# Patient Record
Sex: FEMALE | Race: BLACK OR AFRICAN AMERICAN | NOT HISPANIC OR LATINO
[De-identification: names, ages, dates, MRNs, and addresses within clinical notes are randomized per-mention and may not be internally consistent; named-entity substitution may affect disease eponyms.]

---

## 2020-05-23 ENCOUNTER — TRANSCRIPTION ENCOUNTER (OUTPATIENT)
Age: 65
End: 2020-05-23

## 2022-08-23 ENCOUNTER — EMERGENCY (EMERGENCY)
Facility: HOSPITAL | Age: 67
LOS: 1 days | Discharge: HOME | End: 2022-08-23
Payer: MEDICARE

## 2022-08-23 VITALS
DIASTOLIC BLOOD PRESSURE: 88 MMHG | RESPIRATION RATE: 18 BRPM | TEMPERATURE: 98 F | HEART RATE: 108 BPM | OXYGEN SATURATION: 100 % | WEIGHT: 210.1 LBS | SYSTOLIC BLOOD PRESSURE: 149 MMHG | HEIGHT: 65 IN

## 2022-08-23 PROCEDURE — 99284 EMERGENCY DEPT VISIT MOD MDM: CPT

## 2022-08-23 PROCEDURE — 93010 ELECTROCARDIOGRAM REPORT: CPT

## 2022-08-24 VITALS
HEART RATE: 65 BPM | DIASTOLIC BLOOD PRESSURE: 59 MMHG | OXYGEN SATURATION: 100 % | RESPIRATION RATE: 18 BRPM | TEMPERATURE: 98 F | SYSTOLIC BLOOD PRESSURE: 123 MMHG

## 2022-08-24 PROCEDURE — 99284 EMERGENCY DEPT VISIT MOD MDM: CPT | Mod: 25

## 2022-08-24 PROCEDURE — 70450 CT HEAD/BRAIN W/O DYE: CPT | Mod: MA

## 2022-08-24 PROCEDURE — 70486 CT MAXILLOFACIAL W/O DYE: CPT | Mod: MA

## 2022-08-24 PROCEDURE — 70450 CT HEAD/BRAIN W/O DYE: CPT | Mod: 26,MA

## 2022-08-24 PROCEDURE — 70486 CT MAXILLOFACIAL W/O DYE: CPT | Mod: 26,MA

## 2022-08-24 PROCEDURE — 93005 ELECTROCARDIOGRAM TRACING: CPT

## 2022-08-24 RX ORDER — ACETAMINOPHEN 500 MG
975 TABLET ORAL ONCE
Refills: 0 | Status: COMPLETED | OUTPATIENT
Start: 2022-08-24 | End: 2022-08-24

## 2022-08-24 RX ORDER — IBUPROFEN 200 MG
600 TABLET ORAL ONCE
Refills: 0 | Status: COMPLETED | OUTPATIENT
Start: 2022-08-24 | End: 2022-08-24

## 2022-08-24 RX ADMIN — Medication 600 MILLIGRAM(S): at 01:21

## 2022-08-24 RX ADMIN — Medication 975 MILLIGRAM(S): at 01:20

## 2022-08-24 NOTE — ED PROVIDER NOTE - PHYSICAL EXAMINATION
HEENT: minimal swelling appreciated to left side of face. ttp to lateral aspect of left side of face. neck: supple. ears: good cone of light. no erythema to canal. no drainage from ear. normal TM.

## 2022-08-24 NOTE — ED PROVIDER NOTE - OBJECTIVE STATEMENT
68 y/o female, hx of fibromyalgia, HTN, TMJ, presents to the ER with complaint of left sided jaw pain. states pain started three days ago. states pain radiates from the jaw to the left ear and the left side of her head. states went to both her dentist and endodontist both with no acute findings. denies f/n/v/d, CP, throat pain, dizziness, denies toothache, LOC, numbness, tingling.

## 2022-08-24 NOTE — ED PROVIDER NOTE - ATTENDING APP SHARED VISIT CONTRIBUTION OF CARE
MD Gannon:  patient seen and evaluated with the PA.  I was present for key portions of the History and Physical, and I agree with the Impression and Plan.    MD Gannon:  66 yo F, c/o L jaw pain.    Duration: 3d  Quality: stabbing pain from mandible to L ear.  Worse:  any jaw movement.    Assocated Sx: no Cp/SOB, no n/v/d, no f/c, no diaphoresis.    Better: tramadol (takes for management of fibromyalgia).  VS: wnl.  Physical Exam: adult F, NAD,   NCAT, L parotid gland TTP, PERRL, EOMI, neck supple, RRR, CTA B, Abd: s/nd/nt, Ext: no edema, Neuro: AAOx3, ambulates w/o diff, strength 5/5 & symmetric throughout.  Impression:  possible parotid stone vs TMJ syndrome  Plan:  basic labs, CT max/face, NSAIDs, apap, reassess. MD Gannon:  patient seen and evaluated with the PA.  I was present for key portions of the History and Physical, and I agree with the Impression and Plan.    MD Gannon:  68 yo F, c/o L jaw pain.    Duration: 3d  Quality: stabbing pain from mandible to L ear.  Worse:  any jaw movement.    Assocated Sx: no Cp/SOB, no n/v/d, no f/c, no diaphoresis.    Better: tramadol (takes for management of fibromyalgia).  VS: wnl.  Physical Exam: adult F, NAD,   NCAT, L parotid gland TTP, PERRL, EOMI, neck supple, RRR, CTA B, Abd: s/nd/nt, Ext: no edema, Neuro: AAOx3, ambulates w/o diff, strength 5/5 & symmetric throughout.  Impression:  possible parotid stone vs TMJ syndrome  Plan:  basic labs, CT max/face, NSAIDs, apap, reassess.  ECG= sinus bradycardia; further cardiac workup with trops not indicated at this time.

## 2022-08-24 NOTE — ED PROVIDER NOTE - PATIENT PORTAL LINK FT
You can access the FollowMyHealth Patient Portal offered by [facility] by registering at the following website: [email or web address]. By joining [employer]’s FollowMyHealth portal, you will also be able to view your health information using other applications (apps) compatible with our system.

## 2022-08-24 NOTE — ED PROVIDER NOTE - NSFOLLOWUPCLINICS_GEN_ALL_ED_FT
[facility] - ENT  Otolaryngology (ENT)  [address] [place] [address]  Phone: [phone]  Fax:

## 2022-08-24 NOTE — ED PROVIDER NOTE - PROGRESS NOTE DETAILS
Neymar KITCHEN: imaging reviewed. no acute pathology noted. patient to follow up with ENT regarding today's visit. patient in agreement with plan. strict return precautions discussed. stable for discharge.

## 2022-08-24 NOTE — ED PROVIDER NOTE - CLINICAL SUMMARY MEDICAL DECISION MAKING FREE TEXT BOX
Impression:  possible parotid stone vs TMJ syndrome  Plan:  basic labs, CT max/face, NSAIDs, apap, reassess.

## 2022-08-24 NOTE — ED ADULT NURSE NOTE - OBJECTIVE STATEMENT
66 y/o female, hx of fibromyalgia, HTN, TMJ, presents to the ER with complaint of left sided jaw pain. states pain started three days ago. states pain radiates from the jaw to the left ear and the left side of her head. states went to both her dentist and endodontist both with no acute findings. denies f/n/v/d, CP, throat pain, dizziness, denies toothache, LOC, numbness, tingling

## 2022-08-24 NOTE — ED PROVIDER NOTE - NSFOLLOWUPINSTRUCTIONS_ED_ALL_ED_FT
you were seen in the ER today for jaw pain.   imaging was done while you were in the ER.   your symptoms are likely secondary to TMJ.   you can take Tylenol or Motrin for pain as instructed.   it is important to follow up with the ENT regarding today's visit.     if your symptoms worsen, persist, or if any new symptoms develop, or if you experience any signs of distress, return to the ER as soon as possible.

## 2022-08-25 ENCOUNTER — APPOINTMENT (OUTPATIENT)
Dept: OTOLARYNGOLOGY | Facility: CLINIC | Age: 67
End: 2022-08-25

## 2022-08-25 VITALS
HEIGHT: 65.5 IN | SYSTOLIC BLOOD PRESSURE: 133 MMHG | HEART RATE: 76 BPM | DIASTOLIC BLOOD PRESSURE: 87 MMHG | WEIGHT: 207 LBS | BODY MASS INDEX: 34.07 KG/M2

## 2022-08-25 DIAGNOSIS — M26.609 UNSPECIFIED TEMPOROMANDIBULAR JOINT DISORDER: ICD-10-CM

## 2022-08-25 PROBLEM — Z00.00 ENCOUNTER FOR PREVENTIVE HEALTH EXAMINATION: Status: ACTIVE | Noted: 2022-08-25

## 2022-08-25 PROCEDURE — 31575 DIAGNOSTIC LARYNGOSCOPY: CPT

## 2022-08-25 PROCEDURE — 99204 OFFICE O/P NEW MOD 45 MIN: CPT | Mod: 25

## 2022-08-25 RX ORDER — IBUPROFEN 800 MG/1
800 TABLET, FILM COATED ORAL
Qty: 100 | Refills: 0 | Status: ACTIVE | COMMUNITY
Start: 2022-08-25 | End: 1900-01-01

## 2022-08-25 NOTE — PHYSICAL EXAM
[Nasal Endoscopy Performed] : nasal endoscopy was performed, see procedure section for findings [Normal] : palatine tonsils are normal [de-identified] : Salivary flow through stensons and whartons ducts bilaterally [de-identified] : Both R and L TMJ with popping and clicking upon opening, tenderness to palpation

## 2022-08-25 NOTE — REVIEW OF SYSTEMS
[Ear Pain] : ear pain [Ear Itch] : ear itch [Vertigo] : vertigo [Lightheadedness] : lightheadedness [Ear Noises] : ear noises [Hoarseness] : hoarseness [Throat Clearing] : throat clearing [Throat Pain] : throat pain [Throat Dryness] : throat dryness [Swelling Neck] : swelling neck [Swelling Face] : face swelling [Eye Pain] : eye pain [Cough] : cough [Joint Pain] : joint pain [Confusion] : confusion [Dizziness] : dizziness [Fainting] : fainting [Negative] : Heme/Lymph

## 2022-08-25 NOTE — REASON FOR VISIT
[Initial Consultation] : an initial consultation for [FreeTextEntry2] : Left Side pain ear and jaw  [FreeTextEntry1] : [facility] Emergency Dept

## 2022-08-25 NOTE — HISTORY OF PRESENT ILLNESS
[de-identified] : 67F with PMH significant for tooth grinding, wears , carries diagnosis of TMJ arthralgia, with several days of L ear and jaw pain radiation along the temporalis distribution. Was seen in by dentist and endodontist who did not identify a source of the pain, but prescribed clinda. She was then seen in the ED where CT scan did not show any concerning findings. She presents with pain unchanged from previous few days. Hurts to hold her jaw open. no hearing changes. Does have vertigo, which she has had since covid several months ago. takes meclezine for this PRN. Denies nasal pain/drainage. Also reports hx of reflux for which she is managing conservatively. Endorses left sore throat as well.

## 2022-08-25 NOTE — PROCEDURE
[de-identified] : Bilateral nasal endoscopy:\par \par Left:\par Septum midline\par Mild inferior turbinate hypertrophy \par Middle meatus clear, without masses, polyps, or pus\par Sphenoethmoidal recess clear, without masses, polyps, or pus\par \par Right: \par Septum midline\par Mild inferior turbinate hypertrophy\par Middle meatus clear, without masses, polyps, or pus \par Sphenoethmoidal recess clear, without masses, polyps, or pus\par \par NPL:\par Nasopharynx clear without masses\par Base of tongue without masses or lesions\par Vallecula clear\par Pyriforms clear\par Epiglottis crisp\par TVFs mobile bilaterally \par Glottic airway patent\par Interarytenoid edema and erythema